# Patient Record
Sex: FEMALE | Race: WHITE | Employment: UNEMPLOYED | URBAN - METROPOLITAN AREA
[De-identification: names, ages, dates, MRNs, and addresses within clinical notes are randomized per-mention and may not be internally consistent; named-entity substitution may affect disease eponyms.]

---

## 2017-09-26 ENCOUNTER — OFFICE VISIT (OUTPATIENT)
Dept: UROLOGY | Facility: HOSPITAL | Age: 47
End: 2017-09-26
Attending: OBSTETRICS & GYNECOLOGY
Payer: COMMERCIAL

## 2017-09-26 VITALS
DIASTOLIC BLOOD PRESSURE: 76 MMHG | HEIGHT: 66.5 IN | BODY MASS INDEX: 37.32 KG/M2 | SYSTOLIC BLOOD PRESSURE: 108 MMHG | WEIGHT: 235 LBS

## 2017-09-26 DIAGNOSIS — N39.3 FEMALE STRESS INCONTINENCE: Primary | ICD-10-CM

## 2017-09-26 DIAGNOSIS — M79.10 MYALGIA: ICD-10-CM

## 2017-09-26 DIAGNOSIS — N81.84 PELVIC MUSCLE WASTING: ICD-10-CM

## 2017-09-26 DIAGNOSIS — N81.6 RECTOCELE: ICD-10-CM

## 2017-09-26 LAB
BLOOD URINE: NEGATIVE
CONTROL RUN WITHIN 24 HOURS?: YES
LEUKOCYTE ESTERASE URINE: NEGATIVE
NITRITE URINE: NEGATIVE

## 2017-09-26 PROCEDURE — 81002 URINALYSIS NONAUTO W/O SCOPE: CPT

## 2017-09-26 PROCEDURE — 99201 HC OUTPT EVAL AND MGNT NEW PT LEVEL 1: CPT

## 2017-09-26 PROCEDURE — 87086 URINE CULTURE/COLONY COUNT: CPT | Performed by: OBSTETRICS & GYNECOLOGY

## 2017-09-26 RX ORDER — ESCITALOPRAM OXALATE 10 MG/1
TABLET ORAL
COMMUNITY
Start: 2017-08-15 | End: 2020-07-16

## 2017-09-26 RX ORDER — DULAGLUTIDE 1.5 MG/.5ML
INJECTION, SOLUTION SUBCUTANEOUS
COMMUNITY
Start: 2017-07-24 | End: 2020-05-12 | Stop reason: ALTCHOICE

## 2017-09-26 RX ORDER — DAPAGLIFLOZIN 10 MG/1
TABLET, FILM COATED ORAL
COMMUNITY
Start: 2017-09-01

## 2017-09-26 NOTE — PROGRESS NOTES
Jomar Munoz DO  9/26/2017     Referred by Dr. Michell Wilkes    Patient presents with:   Incontinence: referred by Dr. Michell Wilkes for DELISA  Pelvic Pain: worsening over last 3-4 mos, feels pressure      HPI:  +DELISA  + UUI  Limits activity given UI  +prolapse (feels bul point review of systems was completed. Pertinent positives noted in the the HPI.   No CP  No SOB  No s/sx of UTI    GENERAL EXAM:  GENERAL:  Alert and Oriented, and NAD  HEENT:  Normal, no lesions  LUNGS:  Normal effort  HEART:  RRR  ABDOMEN: soft, no mass Abd, LAVH, Laporascopic Sacrocolpoxy Hysterectomy, Total Laporoscopic Hysterectomy, Robotic  Risks/benefits of salpingooophorectomy  Posterior perineorrhaphy  Mid-urethral slings (Trans Vaginal Taping, TOT, single-incision)    Pt verbalizes understanding o

## 2017-09-28 ENCOUNTER — TELEPHONE (OUTPATIENT)
Dept: UROLOGY | Facility: HOSPITAL | Age: 47
End: 2017-09-28

## 2017-10-16 ENCOUNTER — OFFICE VISIT (OUTPATIENT)
Dept: UROLOGY | Facility: HOSPITAL | Age: 47
End: 2017-10-16
Attending: OBSTETRICS & GYNECOLOGY
Payer: COMMERCIAL

## 2017-10-16 VITALS
HEIGHT: 66.5 IN | WEIGHT: 235 LBS | SYSTOLIC BLOOD PRESSURE: 128 MMHG | BODY MASS INDEX: 37.32 KG/M2 | DIASTOLIC BLOOD PRESSURE: 84 MMHG

## 2017-10-16 DIAGNOSIS — N81.6 RECTOCELE: ICD-10-CM

## 2017-10-16 DIAGNOSIS — N39.3 FEMALE STRESS INCONTINENCE: Primary | ICD-10-CM

## 2017-10-16 DIAGNOSIS — M79.10 MYALGIA: ICD-10-CM

## 2017-10-16 DIAGNOSIS — N81.84 PELVIC MUSCLE WASTING: ICD-10-CM

## 2017-10-16 PROCEDURE — 51741 ELECTRO-UROFLOWMETRY FIRST: CPT

## 2017-10-16 PROCEDURE — 51729 CYSTOMETROGRAM W/VP&UP: CPT

## 2017-10-16 PROCEDURE — 51784 ANAL/URINARY MUSCLE STUDY: CPT

## 2017-10-16 PROCEDURE — 51797 INTRAABDOMINAL PRESSURE TEST: CPT

## 2017-10-16 NOTE — PROCEDURES
.Patient here for urodynamic testing. Procedure explained and confirmed by patient. See evaluation form for results. Both verbal and written discharge instructions were given.   Patient tolerated procedure well and will follow up with Dr. Temo Prather Catheter:  Fr 7 / tdoc  Abd Catheter:     Fr 7 / tdoc   Infusion:  Water Rate 50 mL/min  Temp:  Room  Position:  [x]  Sit  []  Stand  []  Supine  First sensation:   42 mL  First desire to void:   89 mL  Strong desire to void:  240 mL  Maximum cystometric c [599.81]      RECOMMENDATIONS FOR TREATMENT:    DIAGNOSTIC ITEMS:  []  Renal Ultrasound    []  Urinalysis  []  CT Abd/Pelvis    []  Culture  []  Pelvic Ultrasound/Sonohysterogram  []  Cystoscopy  []  Magnetic Resonance Imaging Scan    BLADDER AND BOWEL THE

## 2017-10-16 NOTE — PATIENT INSTRUCTIONS
[facility]  [address] [place] [address] [address], [address]  Office: [phone]      Urodynamic Testing Discharge Instructions: There are NO dietary or activity restrictions. You may resume your normal schedule.       You may hav trouble breathing  · Vaginal bleeding heavier than a period  · Redness, tenderness or swelling of your legs  · Pain or burning when you urinate  · Redness, pain or foul discharge from incision    [place] directed by the nurse. · Continue with your current bowel regime; avoid constipation. What if I have trouble emptying my bladder?   If you are having trouble emptying your bladder, try the following tips:  · Urinate normally then stand up, walk around f

## 2017-10-17 ENCOUNTER — OFFICE VISIT (OUTPATIENT)
Dept: UROLOGY | Facility: HOSPITAL | Age: 47
End: 2017-10-17
Attending: OBSTETRICS & GYNECOLOGY
Payer: COMMERCIAL

## 2017-10-17 VITALS
WEIGHT: 235 LBS | SYSTOLIC BLOOD PRESSURE: 102 MMHG | BODY MASS INDEX: 37.32 KG/M2 | DIASTOLIC BLOOD PRESSURE: 68 MMHG | HEIGHT: 66.5 IN

## 2017-10-17 DIAGNOSIS — N81.6 RECTOCELE: ICD-10-CM

## 2017-10-17 DIAGNOSIS — N39.3 FEMALE STRESS INCONTINENCE: Primary | ICD-10-CM

## 2017-10-17 PROCEDURE — 99211 OFF/OP EST MAY X REQ PHY/QHP: CPT

## 2017-10-17 NOTE — PATIENT INSTRUCTIONS
[facility]     Post-Operative Guidelines      · AVOID CONSTIPATION - Take Miralax: one capful in water or juice each morning.  You can also take each evening if needed. · No lifting over 10 lbs. (1 gallon of milk is 8 lbs. )

## 2017-10-17 NOTE — PROGRESS NOTES
Pt presents w/ initial c/o DELISA & bulge  Urodynamic testing undergone without complication.   670/350cc & 415/7cc  No DO  +DELISA @ 200cc [facility] 502cc    Discussed with patient mgmt options for POP & DELISA    Thorough discussion of surgical risks, benefits,

## 2020-05-12 ENCOUNTER — OFFICE VISIT (OUTPATIENT)
Dept: UROLOGY | Facility: HOSPITAL | Age: 50
End: 2020-05-12
Attending: OBSTETRICS & GYNECOLOGY
Payer: COMMERCIAL

## 2020-05-12 VITALS
WEIGHT: 242 LBS | DIASTOLIC BLOOD PRESSURE: 70 MMHG | SYSTOLIC BLOOD PRESSURE: 116 MMHG | HEIGHT: 66.5 IN | BODY MASS INDEX: 38.43 KG/M2

## 2020-05-12 DIAGNOSIS — N94.10 DYSPAREUNIA, FEMALE: ICD-10-CM

## 2020-05-12 DIAGNOSIS — N39.3 FEMALE STRESS INCONTINENCE: ICD-10-CM

## 2020-05-12 DIAGNOSIS — R35.1 NOCTURIA: Primary | ICD-10-CM

## 2020-05-12 DIAGNOSIS — N81.6 RECTOCELE: ICD-10-CM

## 2020-05-12 DIAGNOSIS — N39.41 URGE INCONTINENCE: ICD-10-CM

## 2020-05-12 DIAGNOSIS — R33.9 INCOMPLETE BLADDER EMPTYING: ICD-10-CM

## 2020-05-12 PROCEDURE — 99212 OFFICE O/P EST SF 10 MIN: CPT

## 2020-05-12 RX ORDER — CITALOPRAM 40 MG/1
TABLET ORAL
COMMUNITY
Start: 2020-05-02

## 2020-05-12 RX ORDER — ATORVASTATIN CALCIUM 10 MG/1
10 TABLET, FILM COATED ORAL DAILY
COMMUNITY
Start: 2020-04-27

## 2020-05-12 RX ORDER — SUMATRIPTAN 50 MG/1
TABLET, FILM COATED ORAL
COMMUNITY
Start: 2020-02-13

## 2020-05-12 NOTE — PROGRESS NOTES
Patient presents to follow up bulge & UI    She c/o   Nocturia x3  DELISA & UUI  hyst with Macek given endometriosis & irregular bleeding    Denies UTIs  Some sense of incomplete voiding  No gross hematuria    UDS 2017   670/350cc & 415/7cc  No DO  +DELISA @ 200 restrictions and expectations reviewed.     Diagnostic Items:  ucx with s/sx of UTI    Medications Discussed:  Fiber  Miralax  OAB meds    Treatment Plan, Non-surgical:   RN teaching/pt education done    Treatment Plan, Surgical:   Hysterectomy-Vag, Abd, LA

## 2020-07-14 ENCOUNTER — TELEPHONE (OUTPATIENT)
Dept: UROLOGY | Facility: HOSPITAL | Age: 50
End: 2020-07-14

## 2020-07-14 NOTE — TELEPHONE ENCOUNTER
Patient called, 7/9/2020 had VH with Left Salp.,ID,MUS,USVVS,CYSTO. Has catheter VT on Thursday. Patient has pinkish urine, white stringy, and a clot in bag. Told her may have irritated bladder and to drink fluids.   Call with fever, bright red urine, and/

## 2020-07-16 ENCOUNTER — OFFICE VISIT (OUTPATIENT)
Dept: UROLOGY | Facility: HOSPITAL | Age: 50
End: 2020-07-16
Attending: OBSTETRICS & GYNECOLOGY
Payer: COMMERCIAL

## 2020-07-16 VITALS
HEIGHT: 66.5 IN | TEMPERATURE: 98 F | SYSTOLIC BLOOD PRESSURE: 108 MMHG | BODY MASS INDEX: 38.43 KG/M2 | DIASTOLIC BLOOD PRESSURE: 64 MMHG | WEIGHT: 242 LBS

## 2020-07-16 DIAGNOSIS — Z98.890 POST-OPERATIVE STATE: ICD-10-CM

## 2020-07-16 DIAGNOSIS — R33.9 RETENTION OF URINE: Primary | ICD-10-CM

## 2020-07-16 PROCEDURE — 99212 OFFICE O/P EST SF 10 MIN: CPT

## 2020-07-16 PROCEDURE — 51798 US URINE CAPACITY MEASURE: CPT

## 2020-07-16 NOTE — PROCEDURES
Patient here for voiding trial.  Hyatt catheter drained and then using a 60 cc Adali syringe, 200 ml sterile water was instilled per gravity, balloon deflated using 10 cc syringe, and catheter removed.   Pt up to bathroom and voided 100 mL +, missed specip

## 2020-07-16 NOTE — PATIENT INSTRUCTIONS
Voiding Trial Instructions  You have passed your voiding trial at 9am.  Please make sure you are drinking some water today. You can take your Motrin to help with any swelling from the catheter.   It is important to try and empty your bladder every two hour

## 2020-07-22 ENCOUNTER — TELEPHONE (OUTPATIENT)
Dept: UROLOGY | Facility: HOSPITAL | Age: 50
End: 2020-07-22

## 2020-07-22 NOTE — TELEPHONE ENCOUNTER
Called patient to complete  FMLA papers, Patient a manager at [employer], busy with 3 units. Plan to return to work 8/24/2020 Dr Berle Collet filled out [facility] papers for his procedures per Patient. Patient here for voiding trial 7/16/2020 passed.   This past weekend P

## 2020-07-23 ENCOUNTER — NURSE ONLY (OUTPATIENT)
Dept: UROLOGY | Facility: HOSPITAL | Age: 50
End: 2020-07-23
Attending: OBSTETRICS & GYNECOLOGY
Payer: COMMERCIAL

## 2020-07-23 VITALS — BODY MASS INDEX: 38.43 KG/M2 | WEIGHT: 242 LBS | HEIGHT: 66.5 IN | RESPIRATION RATE: 16 BRPM

## 2020-07-23 DIAGNOSIS — R33.9 INCOMPLETE BLADDER EMPTYING: Primary | ICD-10-CM

## 2020-07-23 LAB
CONTROL RUN WITHIN 24 HOURS?: YES
LEUKOCYTE ESTERASE URINE: NEGATIVE
NITRITE URINE: NEGATIVE

## 2020-07-23 PROCEDURE — 87086 URINE CULTURE/COLONY COUNT: CPT

## 2020-07-23 PROCEDURE — 51701 INSERT BLADDER CATHETER: CPT

## 2020-07-23 PROCEDURE — 87186 SC STD MICRODIL/AGAR DIL: CPT

## 2020-07-23 PROCEDURE — 87077 CULTURE AEROBIC IDENTIFY: CPT

## 2020-07-23 NOTE — PROCEDURES
Pt spoke with Rn yesterday stating she had some bleeding over the weekend feels like she is not emptying well . Pt here to void and follow up with PVR. Pt voided 200. Straight cath residual was 10cc. Asked pt if any more bleeding since the weekend.   Pt

## 2020-07-27 ENCOUNTER — TELEPHONE (OUTPATIENT)
Dept: UROLOGY | Facility: HOSPITAL | Age: 50
End: 2020-07-27

## 2020-07-27 RX ORDER — SULFAMETHOXAZOLE AND TRIMETHOPRIM 800; 160 MG/1; MG/1
1 TABLET ORAL 2 TIMES DAILY
Qty: 14 TABLET | Refills: 0 | Status: SHIPPED | OUTPATIENT
Start: 2020-07-27 | End: 2020-08-04 | Stop reason: ALTCHOICE

## 2020-07-27 NOTE — TELEPHONE ENCOUNTER
Dr. Boris Up (on call) reviewed urine culture results. VORB Bactrim DS PO BID x 7 days, see orders. Called pt with urine culture results and new orders. Pt reports urinary urgency some burning with urination persists, denies cloudy, odorous urine or fever.

## 2020-08-04 ENCOUNTER — OFFICE VISIT (OUTPATIENT)
Dept: UROLOGY | Facility: HOSPITAL | Age: 50
End: 2020-08-04
Attending: OBSTETRICS & GYNECOLOGY
Payer: COMMERCIAL

## 2020-08-04 VITALS
WEIGHT: 242 LBS | DIASTOLIC BLOOD PRESSURE: 70 MMHG | BODY MASS INDEX: 38.43 KG/M2 | SYSTOLIC BLOOD PRESSURE: 116 MMHG | HEIGHT: 66.5 IN

## 2020-08-04 DIAGNOSIS — R35.0 URINARY FREQUENCY: ICD-10-CM

## 2020-08-04 DIAGNOSIS — Z98.890 POST-OPERATIVE STATE: Primary | ICD-10-CM

## 2020-08-04 PROCEDURE — 99212 OFFICE O/P EST SF 10 MIN: CPT

## 2020-08-04 PROCEDURE — 87086 URINE CULTURE/COLONY COUNT: CPT | Performed by: OBSTETRICS & GYNECOLOGY

## 2020-08-04 NOTE — PROGRESS NOTES
She is s/p Post-Op Summary  Procedure Date: 07/09/20  Procedure Name: Vaginal Hysterectomy;Mid-urethral Sling;Posterior Repair;Cystoscopy; Other (Please specify in comments)(left salpingectomy)  Post-Op Symptoms: Bleeding  Do you feel your surgery was succe

## (undated) NOTE — LETTER
Consent to Procedure/Sedation    Date: __10/16/2017_____    Time: ___1:24 PM ___    1. I authorize the performance upon Roma Calvin the following:  Urodynamics (UDS)      2.  Manish Rivera(and whomever is designated as the doctor’s as ___________________________    ___________________    Witness: ____________________     Date: ______________    Printed: 10/16/2017   1:24 PM    Patient Name: Guerrero Agustin        : 1970       Medical Record #: [id number]